# Patient Record
Sex: FEMALE | Race: WHITE | URBAN - NONMETROPOLITAN AREA
[De-identification: names, ages, dates, MRNs, and addresses within clinical notes are randomized per-mention and may not be internally consistent; named-entity substitution may affect disease eponyms.]

---

## 2020-11-04 ENCOUNTER — VIRTUAL VISIT (OUTPATIENT)
Dept: PSYCHIATRY | Age: 25
End: 2020-11-04
Payer: COMMERCIAL

## 2020-11-04 PROBLEM — F41.9 ANXIETY: Status: ACTIVE | Noted: 2020-11-04

## 2020-11-04 PROCEDURE — 90792 PSYCH DIAG EVAL W/MED SRVCS: CPT | Performed by: PSYCHIATRY & NEUROLOGY

## 2020-11-04 RX ORDER — HYDROXYZINE HYDROCHLORIDE 25 MG/1
25-75 TABLET, FILM COATED ORAL 3 TIMES DAILY PRN
Qty: 90 TABLET | Refills: 2 | Status: SHIPPED | OUTPATIENT
Start: 2020-11-04 | End: 2021-05-03 | Stop reason: SDUPTHER

## 2020-11-04 NOTE — PROGRESS NOTES
Chief Complaint   Patient presents with    Psychiatric Evaluation     anxiety     Ivan Hubbard is a 26 y.o. female being evaluated by a Virtual Visit (video visit) encounter to address concerns as mentioned above. A caregiver was present when appropriate. Due to this being a TeleHealth encounter (During KCMNX-15 public health emergency), evaluation of the following organ systems was limited: Vitals/Constitutional/EENT/Resp/CV/GI//MS/Neuro/Skin/Heme-Lymph-Imm. Pursuant to the emergency declaration under the [address] [place] [address], [address] [place] [address] authority and the [employer] Act, this Virtual Visit was conducted with patient's (and/or legal guardian's) consent, to reduce the patient's risk of exposure to COVID-19 and provide necessary medical care. The patient (and/or legal guardian) has also been advised to contact this office for worsening conditions or problems, and seek emergency medical treatment and/or call 911 if deemed necessary. Patient identification was verified at the start of the visit: Yes    Total time spent for this encounter: Not billed by time    Services were provided through a video synchronous discussion virtually to substitute for in-person clinic visit. Patient and provider were located at their individual homes. --Piotr Nguyen MD on 11/4/2020 at 4:09 PM    An electronic signature was used to authenticate this note. Snehal Villalobos is a 50-year-old single  female from [place] [address] referred by Katharina Rgoers MD for complaints of anxiety. She told me that she had tried [facility], because she was involved with them when she was much younger. However there was a problem getting an appointment with anybody, and she felt that she was getting a run around. She also told me that her current medical treaters declined to prescribe anxiety medications for her that she used to get from [facility].   However she was unable to tell me what drug that was exactly. When I asked for a clinical description, she told me that she would easily become overwhelmed. If the environment was too noisy, too crowded, or she felt too pressured, she would feel she had to retreat and leave the situation. I went through a list of symptoms of panic attacks and she really denies that these are present. She is not short of breath, does not have tachycardia, is not diaphoretic, not dizzy or lightheaded. She only rarely has a headache, and denies nausea and vomiting or diarrhea. Once this gets triggered she feels she has it for several hours and must leave. She tells me that her managers will suggest that she breathe slowly and calm, or simply calm down, neither of which is effective. She reports worrying to excess only on occasion. She has some trouble with sleep, and tells me that her brain will not shut off. She uses amitriptyline 50 mg at night for sleep. She has occasionally used melatonin but feels that causes nightmares. She does fidget with her hands. She acknowledges mild depression. She says this is not bad, and is better now than it has been in the past.  She thinks that lack of sleep may have been a factor in feeling depressed. Medical:    She tells me that she does have a history of migraine headaches, and pseudotumor cerebri. She also has a history of bipolar illness and ADHD she claims. Those were diagnosed during childhood. That was perhaps Dr. Surinder Nunn. Current medications include amitriptyline 50, Aimovig injections, Nurtec for migraines, Diamox. She was at one time on valproate but it was discontinued because she thought she might want to get pregnant. However she decided against that. She said she had had seizures as a child but none since. She reports an allergy to Phenergan. She denies any use of street drugs or alcohol.     Childhood:    She was born in [place], and grew up there.  In fact she remains there at this time. She said that during childhood she had trouble making friends with same aged peers. She tended to hang around with the adults. She denied any history of abuse or trauma. She said she had to go to her sister's soccer games, but mostly stayed in the vehicle by herself. She has 2 older sisters. Education:    She started school a year late due to asthma, she said. That no longer bothers her. She is a high school graduate. After high school she mostly stayed with her grandmother because her grandfather had  from Alzheimer's disease. After leaving her grandmother's, she went home. Her mother was becoming ill at that time with pancreatitis. She was drinking a lot and had a PICC line which Iram Chery claims was mismanaged. She said it became infected and clotted and had to be removed. Mother's neck ballooned out and then an infection developed. Mother eventually had to have her leg amputated above the knee. Once that happened she began to recover. There were apparently many deaths in the family around that time. Employment:    Heather Newton was hired at [employer], as a  for customers. However she said [employer] kept Orte[employer] Lauraside with her hours to avoid making her full-timem because they would have had to pay benefits. Catrina then transferred to [employer], where she had better benefits. She continues to work in [employer]. Marital:    She has not been  and has no children. She is not currently in any relationship. Family history:    She knew that an uncle had seen a psychiatrist and was never stable. He was an alcoholic and used methamphetamine. Paternal grandfather was also alcoholic, but she says is now better. Past psychiatric history:    She has never been hospitalized. She told me that as a child she was diagnosed as being bipolar, and also having ADHD.   She did tell me that she had trouble maintaining focus and attention, and would quickly become bored with things. That is less of an issue currently. When I asked what bipolar symptoms she had she only could tell me that she was irritable. She replied negatively to questions about grandiose mood states, and it appears that the mood states are quite brief, a matter of minutes only. I did not find myself convinced. Mental Status Examination    Level of consciousness:  within normal limits  Appearance:  well-appearing, street clothes, in chair, good grooming and good hygiene  Behavior/Motor:  no abnormalities noted  Attitude toward examiner:  cooperative, attentive and good eye contact  Speech:  spontaneous, normal rate, normal volume and well articulated  Mood:  anxious mild depression  Affect:  mood congruent  Thought processes:  linear, goal directed and coherent  Thought content:  Homocidal ideation: none  Suicidal Ideation:  denies suicidal ideation  Delusions:  no evidence of delusionsnone  Perceptual Disturbance:  denies any perceptual disturbance  Cognition:  oriented to person, place, and time  Concentration succeeded  Memory intact  Fund of knowledge:  good  Abstract thinking: fair  Insight:  good  Judgment:  good  Anxiety:   Generalized: Yes  Excessive Worry: No  Panic Attacks: No  Frequency:  Housebound: No   Obsession: No   Compulsion: No  Flashbacks:No  Nightmares No  Hyperarousal No     DIAGNOSTIC IMPRESSION  MAR  ? ADHD    Plan  Trial hydroxyzine  Consider SSRI if this fails  Current Outpatient Medications   Medication Sig Dispense Refill    hydrOXYzine (ATARAX) 25 MG tablet Take 1-3 tablets by mouth 3 times daily as needed for Anxiety 90 tablet 2     No current facility-administered medications for this visit.

## 2021-05-03 RX ORDER — HYDROXYZINE HYDROCHLORIDE 25 MG/1
25-75 TABLET, FILM COATED ORAL 3 TIMES DAILY PRN
Qty: 90 TABLET | Refills: 0 | Status: SHIPPED | OUTPATIENT
Start: 2021-05-03 | End: 2021-06-02

## 2021-06-24 RX ORDER — HYDROXYZINE HYDROCHLORIDE 25 MG/1
TABLET, FILM COATED ORAL
Qty: 90 TABLET | Refills: 0 | Status: SHIPPED | OUTPATIENT
Start: 2021-06-24 | End: 2021-07-29 | Stop reason: SDUPTHER

## 2021-07-29 RX ORDER — HYDROXYZINE HYDROCHLORIDE 25 MG/1
TABLET, FILM COATED ORAL
Qty: 90 TABLET | Refills: 0 | Status: SHIPPED | OUTPATIENT
Start: 2021-07-29

## 2021-07-29 NOTE — TELEPHONE ENCOUNTER
Angelo Conrad (mother) called into the office stating that her daughter previously seen Dr. Edwards Nicely and then was transferred to [facility] and never seen due to her leaving the practice. But she has gotten her daughter into [facility] twice but is not yet scheduled with a psychiatrist there. She went for her intake and then her 2nd appt to go over her intake and is scheduled to return in a couple weeks but unsure if that is with the psychiatrist yet or not. She just does not want her daughter to run out of her anxiety medication. She said that her daughter is in fear of loosing her job if she does not get her anxiety medication because she gets really bad anxiety around the public. She is requesting a last 30 day refill on her Hydroxyzine 25mg medication. Records indicate that the last refill was sent in on 06/24/21 for a 30 day supply (TID PRN). Please advise if you are willing to do so?

## 2021-07-29 NOTE — ADDENDUM NOTE
9/4/2019      Roopa Fang  [address]     I have enjoyed working with you to improve your health. I would like to continue to provide you support. However, I have been unable to reach you at, [phone]. Please let me know if I can help schedule an office visit for you or answer any questions. Dario Otto MD is interested in your health and hopes to hear from you soon. If you would like continued access to your Nurse Care Coordinator, Saeed Carrizales RN, you can reach me at [phone]. I will wait to hear from you and will no longer reach out to you. Dario Otto MD and his/her team will continue to provide care and be available for questions.       In good health,     Saeed Carrizales RN Addended by: Cora Baptiste on: 7/29/2021 03:30 PM     Modules accepted: Orders